# Patient Record
Sex: FEMALE | Race: WHITE | URBAN - METROPOLITAN AREA
[De-identification: names, ages, dates, MRNs, and addresses within clinical notes are randomized per-mention and may not be internally consistent; named-entity substitution may affect disease eponyms.]

---

## 2019-10-17 ENCOUNTER — OFFICE VISIT (OUTPATIENT)
Dept: URBAN - METROPOLITAN AREA CLINIC 25 | Facility: CLINIC | Age: 84
End: 2019-10-17
Payer: COMMERCIAL

## 2019-10-17 DIAGNOSIS — H35.3132 NONEXUDATIVE AGE-RELATED MACULAR DEGENERATION, BILATERAL, INTERMEDIATE DRY STAGE: Primary | ICD-10-CM

## 2019-10-17 PROCEDURE — 92134 CPTRZ OPH DX IMG PST SGM RTA: CPT | Performed by: OPTOMETRIST

## 2019-10-17 PROCEDURE — 92004 COMPRE OPH EXAM NEW PT 1/>: CPT | Performed by: OPTOMETRIST

## 2019-10-17 ASSESSMENT — INTRAOCULAR PRESSURE
OS: 16
OD: 14

## 2019-10-17 NOTE — IMPRESSION/PLAN
Impression: Nonexudative age-related macular degeneration, bilateral, intermediate dry stage: H35.3132. Plan: Macular degeneration, dry type with stable vision. Will continue to monitor vision and the patient has been instructed to call with any vision changes. 
AREDs vitamins daily

## 2020-05-18 ENCOUNTER — OFFICE VISIT (OUTPATIENT)
Dept: URBAN - METROPOLITAN AREA CLINIC 25 | Facility: CLINIC | Age: 85
End: 2020-05-18
Payer: COMMERCIAL

## 2020-05-18 PROCEDURE — 92134 CPTRZ OPH DX IMG PST SGM RTA: CPT | Performed by: OPTOMETRIST

## 2020-05-18 PROCEDURE — 92012 INTRM OPH EXAM EST PATIENT: CPT | Performed by: OPTOMETRIST

## 2020-05-18 ASSESSMENT — INTRAOCULAR PRESSURE
OD: 14
OS: 15

## 2020-05-18 NOTE — IMPRESSION/PLAN
Impression: Nonexudative age-related macular degeneration, bilateral, intermediate dry stage: H35.3132. Plan: Macular degeneration, dry type with stable vision. Will continue to monitor vision and the patient has been instructed to call with any vision changes. AREDS 2 daily.

## 2020-11-18 ENCOUNTER — OFFICE VISIT (OUTPATIENT)
Dept: URBAN - METROPOLITAN AREA CLINIC 25 | Facility: CLINIC | Age: 85
End: 2020-11-18
Payer: COMMERCIAL

## 2020-11-18 DIAGNOSIS — H52.4 PRESBYOPIA: Primary | ICD-10-CM

## 2020-11-18 DIAGNOSIS — H26.493 OTHER SECONDARY CATARACT, BILATERAL: ICD-10-CM

## 2020-11-18 PROCEDURE — 92012 INTRM OPH EXAM EST PATIENT: CPT | Performed by: OPTOMETRIST

## 2020-11-18 ASSESSMENT — INTRAOCULAR PRESSURE
OS: 15
OD: 17

## 2020-11-18 ASSESSMENT — VISUAL ACUITY
OD: 20/60
OS: 20/30

## 2020-11-18 NOTE — IMPRESSION/PLAN
Impression: Nonexudative age-related macular degeneration, bilateral, intermediate dry stage: H35.3132.  Plan: pt edu. monitro in 6 mo with dfe and oct

## 2020-12-11 ENCOUNTER — OFFICE VISIT (OUTPATIENT)
Dept: URBAN - METROPOLITAN AREA CLINIC 16 | Facility: CLINIC | Age: 85
End: 2020-12-11
Payer: COMMERCIAL

## 2020-12-11 PROCEDURE — 99204 OFFICE O/P NEW MOD 45 MIN: CPT | Performed by: OPHTHALMOLOGY

## 2020-12-11 ASSESSMENT — INTRAOCULAR PRESSURE
OD: 14
OS: 14

## 2020-12-11 ASSESSMENT — KERATOMETRY
OS: 46.88
OD: 46.63

## 2020-12-11 ASSESSMENT — VISUAL ACUITY
OD: 20/60
OS: 20/30

## 2020-12-11 NOTE — IMPRESSION/PLAN
Impression: Nonexudative age-related macular degeneration, bilateral, intermediate dry stage: H35.3132. Condition: new prob, no addtl w/u needed. Plan: Macular degeneration, dry type with stable vision. Will continue to monitor vision and the patient has been instructed to call with any vision changes.

## 2020-12-11 NOTE — IMPRESSION/PLAN
Impression: Other secondary cataract, bilateral: H26.493. Ruth Curtis Visually significant, quality of life issue, could improve with surgery. Plan: Discussed all risks, benefits, alternatives, procedures and recovery. Patient understands changing glasses will not improve vision. Patient desires to have surgery, recommend yag capsulotomy OD.

## 2021-02-01 ENCOUNTER — SURGERY (OUTPATIENT)
Dept: URBAN - METROPOLITAN AREA SURGERY 11 | Facility: SURGERY | Age: 86
End: 2021-02-01
Payer: COMMERCIAL

## 2021-02-01 PROCEDURE — 66821 AFTER CATARACT LASER SURGERY: CPT | Performed by: OPHTHALMOLOGY

## 2021-02-02 ENCOUNTER — POST-OPERATIVE VISIT (OUTPATIENT)
Dept: URBAN - METROPOLITAN AREA CLINIC 16 | Facility: CLINIC | Age: 86
End: 2021-02-02
Payer: COMMERCIAL

## 2021-02-02 PROCEDURE — 99024 POSTOP FOLLOW-UP VISIT: CPT | Performed by: OPTOMETRIST

## 2021-02-02 ASSESSMENT — INTRAOCULAR PRESSURE
OD: 14
OS: 18

## 2021-02-02 NOTE — IMPRESSION/PLAN
Impression:  Encounter for surgical aftercare following surgery on a sense organ  Z48.810. Plan: Schedule yag laser OS --Advised patient to use artificial tears for comfort.

## 2021-02-15 ENCOUNTER — SURGERY (OUTPATIENT)
Dept: URBAN - METROPOLITAN AREA SURGERY 11 | Facility: SURGERY | Age: 86
End: 2021-02-15
Payer: COMMERCIAL

## 2021-02-15 PROCEDURE — 66821 AFTER CATARACT LASER SURGERY: CPT | Performed by: OPHTHALMOLOGY

## 2021-02-24 ENCOUNTER — POST-OPERATIVE VISIT (OUTPATIENT)
Dept: URBAN - METROPOLITAN AREA CLINIC 16 | Facility: CLINIC | Age: 86
End: 2021-02-24
Payer: COMMERCIAL

## 2021-02-24 DIAGNOSIS — Z48.810 ENCOUNTER FOR SURGICAL AFTERCARE FOLLOWING SURGERY ON A SENSE ORGAN: Primary | ICD-10-CM

## 2021-02-24 PROCEDURE — 99024 POSTOP FOLLOW-UP VISIT: CPT | Performed by: OPTOMETRIST

## 2021-02-24 ASSESSMENT — INTRAOCULAR PRESSURE
OS: 14
OD: 14

## 2021-02-24 NOTE — IMPRESSION/PLAN
Impression:  Encounter for surgical aftercare following surgery on a sense organ  Z48.810. Plan: --Advised patient to use artificial tears for comfort.

## 2021-03-16 ENCOUNTER — OFFICE VISIT (OUTPATIENT)
Dept: URBAN - METROPOLITAN AREA CLINIC 25 | Facility: CLINIC | Age: 86
End: 2021-03-16
Payer: COMMERCIAL

## 2021-03-16 PROCEDURE — 92012 INTRM OPH EXAM EST PATIENT: CPT | Performed by: OPTOMETRIST

## 2021-03-16 ASSESSMENT — VISUAL ACUITY
OS: 20/40
OD: 20/100

## 2021-03-16 ASSESSMENT — INTRAOCULAR PRESSURE
OS: 16
OD: 16

## 2022-03-16 ENCOUNTER — OFFICE VISIT (OUTPATIENT)
Dept: URBAN - METROPOLITAN AREA CLINIC 28 | Facility: CLINIC | Age: 87
End: 2022-03-16
Payer: COMMERCIAL

## 2022-03-16 PROCEDURE — 92004 COMPRE OPH EXAM NEW PT 1/>: CPT | Performed by: OPTOMETRIST

## 2022-03-16 PROCEDURE — 92134 CPTRZ OPH DX IMG PST SGM RTA: CPT | Performed by: OPTOMETRIST

## 2022-03-16 ASSESSMENT — INTRAOCULAR PRESSURE
OS: 16
OD: 19

## 2022-03-16 NOTE — IMPRESSION/PLAN
Impression: Nonexudative age-related macular degeneration, bilateral, intermediate dry stage: H35.3132. Plan: Educated on macular degeneration and exam findings. Educated on importance of UV eye protection, avoidance of smoke, healthy diet with green leafy vegetables, and home monitoring of vision with [place] grid. Recommend AREDS2 BID. Emphasized importance of regular monitoring.

## 2022-09-19 ENCOUNTER — OFFICE VISIT (OUTPATIENT)
Dept: URBAN - METROPOLITAN AREA CLINIC 28 | Facility: CLINIC | Age: 87
End: 2022-09-19
Payer: COMMERCIAL

## 2022-09-19 DIAGNOSIS — H35.3132 NONEXUDATIVE AGE-RELATED MACULAR DEGENERATION, BILATERAL, INTERMEDIATE DRY STAGE: Primary | ICD-10-CM

## 2022-09-19 DIAGNOSIS — H16.223 KERATOCONJUNCTIVITIS SICCA, NOT SPECIFIED AS SJÖGREN'S, BILATERAL: ICD-10-CM

## 2022-09-19 DIAGNOSIS — H52.4 PRESBYOPIA: ICD-10-CM

## 2022-09-19 PROCEDURE — 92014 COMPRE OPH EXAM EST PT 1/>: CPT | Performed by: OPTOMETRIST

## 2022-09-19 PROCEDURE — 92134 CPTRZ OPH DX IMG PST SGM RTA: CPT | Performed by: OPTOMETRIST

## 2022-09-19 ASSESSMENT — INTRAOCULAR PRESSURE
OS: 16
OD: 18

## 2022-09-19 ASSESSMENT — KERATOMETRY
OD: 46.88
OS: 46.63

## 2022-09-19 ASSESSMENT — VISUAL ACUITY
OS: 20/40
OD: 20/100

## 2023-03-20 ENCOUNTER — OFFICE VISIT (OUTPATIENT)
Dept: URBAN - METROPOLITAN AREA CLINIC 28 | Facility: CLINIC | Age: 88
End: 2023-03-20
Payer: COMMERCIAL

## 2023-03-20 DIAGNOSIS — H35.3132 NONEXUDATIVE AGE-RELATED MACULAR DEGENERATION, BILATERAL, INTERMEDIATE DRY STAGE: Primary | ICD-10-CM

## 2023-03-20 DIAGNOSIS — H16.223 KERATOCONJUNCTIVITIS SICCA, NOT SPECIFIED AS SJÖGREN'S, BILATERAL: ICD-10-CM

## 2023-03-20 PROCEDURE — 92014 COMPRE OPH EXAM EST PT 1/>: CPT | Performed by: OPTOMETRIST

## 2023-03-20 PROCEDURE — 92134 CPTRZ OPH DX IMG PST SGM RTA: CPT | Performed by: OPTOMETRIST

## 2023-03-20 ASSESSMENT — KERATOMETRY
OD: 46.75
OS: 46.75

## 2023-03-20 ASSESSMENT — INTRAOCULAR PRESSURE
OD: 17
OS: 15

## 2023-08-21 ENCOUNTER — OFFICE VISIT (OUTPATIENT)
Dept: URBAN - METROPOLITAN AREA CLINIC 28 | Facility: CLINIC | Age: 88
End: 2023-08-21
Payer: COMMERCIAL

## 2023-08-21 DIAGNOSIS — H35.3132 NONEXUDATIVE AGE-RELATED MACULAR DEGENERATION, BILATERAL, INTERMEDIATE DRY STAGE: Primary | ICD-10-CM

## 2023-08-21 PROCEDURE — 92014 COMPRE OPH EXAM EST PT 1/>: CPT | Performed by: OPTOMETRIST

## 2023-08-21 PROCEDURE — 92134 CPTRZ OPH DX IMG PST SGM RTA: CPT | Performed by: OPTOMETRIST

## 2023-08-21 ASSESSMENT — KERATOMETRY
OD: 46.75
OS: 46.63

## 2023-08-21 ASSESSMENT — INTRAOCULAR PRESSURE
OD: 14
OS: 14

## 2023-09-18 ENCOUNTER — OFFICE VISIT (OUTPATIENT)
Dept: URBAN - METROPOLITAN AREA CLINIC 28 | Facility: CLINIC | Age: 88
End: 2023-09-18
Payer: COMMERCIAL

## 2023-09-18 DIAGNOSIS — H43.21 CRYSTALLINE DEPOSITS IN VITREOUS BODY, RIGHT EYE: ICD-10-CM

## 2023-09-18 DIAGNOSIS — H35.3133 NEXDTVE AGE-REL MCLR DEGN, BI, ADV ATRPC WITHOUT SBFVL INVL: Primary | ICD-10-CM

## 2023-09-18 PROCEDURE — 92134 CPTRZ OPH DX IMG PST SGM RTA: CPT | Performed by: OPHTHALMOLOGY

## 2023-09-18 PROCEDURE — 99204 OFFICE O/P NEW MOD 45 MIN: CPT | Performed by: OPHTHALMOLOGY

## 2023-09-18 ASSESSMENT — INTRAOCULAR PRESSURE
OD: 13
OS: 13

## 2023-09-19 ENCOUNTER — OFFICE VISIT (OUTPATIENT)
Dept: URBAN - METROPOLITAN AREA CLINIC 28 | Facility: CLINIC | Age: 88
End: 2023-09-19
Payer: COMMERCIAL

## 2023-09-19 DIAGNOSIS — H52.4 PRESBYOPIA: Primary | ICD-10-CM

## 2023-09-19 ASSESSMENT — VISUAL ACUITY
OD: 20/200
OS: 20/50